# Patient Record
Sex: MALE | Race: BLACK OR AFRICAN AMERICAN | NOT HISPANIC OR LATINO | Employment: UNEMPLOYED | URBAN - METROPOLITAN AREA
[De-identification: names, ages, dates, MRNs, and addresses within clinical notes are randomized per-mention and may not be internally consistent; named-entity substitution may affect disease eponyms.]

---

## 2023-03-28 ENCOUNTER — HOSPITAL ENCOUNTER (EMERGENCY)
Facility: CLINIC | Age: 23
Discharge: HOME | End: 2023-03-28
Attending: EMERGENCY MEDICINE | Admitting: EMERGENCY MEDICINE
Payer: COMMERCIAL

## 2023-03-28 ENCOUNTER — APPOINTMENT (OUTPATIENT)
Dept: GENERAL RADIOLOGY | Facility: CLINIC | Age: 23
End: 2023-03-28
Attending: EMERGENCY MEDICINE
Payer: COMMERCIAL

## 2023-03-28 VITALS
SYSTOLIC BLOOD PRESSURE: 110 MMHG | HEART RATE: 64 BPM | OXYGEN SATURATION: 99 % | RESPIRATION RATE: 16 BRPM | WEIGHT: 165.9 LBS | TEMPERATURE: 97.7 F | DIASTOLIC BLOOD PRESSURE: 59 MMHG

## 2023-03-28 DIAGNOSIS — R07.89 ATYPICAL CHEST PAIN: ICD-10-CM

## 2023-03-28 LAB
ANION GAP SERPL CALCULATED.3IONS-SCNC: 13 MMOL/L (ref 7–15)
BASOPHILS # BLD AUTO: 0 10E3/UL (ref 0–0.2)
BASOPHILS NFR BLD AUTO: 0 %
BUN SERPL-MCNC: 18.5 MG/DL (ref 6–20)
CALCIUM SERPL-MCNC: 9.6 MG/DL (ref 8.6–10)
CHLORIDE SERPL-SCNC: 98 MMOL/L (ref 98–107)
CREAT SERPL-MCNC: 0.94 MG/DL (ref 0.67–1.17)
DEPRECATED HCO3 PLAS-SCNC: 25 MMOL/L (ref 22–29)
EOSINOPHIL # BLD AUTO: 0.5 10E3/UL (ref 0–0.7)
EOSINOPHIL NFR BLD AUTO: 9 %
ERYTHROCYTE [DISTWIDTH] IN BLOOD BY AUTOMATED COUNT: 11.1 % (ref 10–15)
GFR SERPL CREATININE-BSD FRML MDRD: >90 ML/MIN/1.73M2
GLUCOSE SERPL-MCNC: 78 MG/DL (ref 70–99)
HCT VFR BLD AUTO: 43 % (ref 40–53)
HGB BLD-MCNC: 14.9 G/DL (ref 13.3–17.7)
IMM GRANULOCYTES # BLD: 0 10E3/UL
IMM GRANULOCYTES NFR BLD: 0 %
LYMPHOCYTES # BLD AUTO: 1.4 10E3/UL (ref 0.8–5.3)
LYMPHOCYTES NFR BLD AUTO: 27 %
MCH RBC QN AUTO: 31.3 PG (ref 26.5–33)
MCHC RBC AUTO-ENTMCNC: 34.7 G/DL (ref 31.5–36.5)
MCV RBC AUTO: 90 FL (ref 78–100)
MONOCYTES # BLD AUTO: 0.6 10E3/UL (ref 0–1.3)
MONOCYTES NFR BLD AUTO: 11 %
NEUTROPHILS # BLD AUTO: 2.8 10E3/UL (ref 1.6–8.3)
NEUTROPHILS NFR BLD AUTO: 53 %
NRBC # BLD AUTO: 0 10E3/UL
NRBC BLD AUTO-RTO: 0 /100
PLATELET # BLD AUTO: 220 10E3/UL (ref 150–450)
POTASSIUM SERPL-SCNC: 3.5 MMOL/L (ref 3.4–5.3)
RBC # BLD AUTO: 4.76 10E6/UL (ref 4.4–5.9)
SODIUM SERPL-SCNC: 136 MMOL/L (ref 136–145)
TROPONIN T SERPL HS-MCNC: 7 NG/L
TSH SERPL DL<=0.005 MIU/L-ACNC: 0.91 UIU/ML (ref 0.3–4.2)
WBC # BLD AUTO: 5.3 10E3/UL (ref 4–11)

## 2023-03-28 PROCEDURE — 84484 ASSAY OF TROPONIN QUANT: CPT | Performed by: EMERGENCY MEDICINE

## 2023-03-28 PROCEDURE — 93005 ELECTROCARDIOGRAM TRACING: CPT | Performed by: EMERGENCY MEDICINE

## 2023-03-28 PROCEDURE — 85025 COMPLETE CBC W/AUTO DIFF WBC: CPT | Performed by: EMERGENCY MEDICINE

## 2023-03-28 PROCEDURE — 93010 ELECTROCARDIOGRAM REPORT: CPT | Performed by: EMERGENCY MEDICINE

## 2023-03-28 PROCEDURE — 84443 ASSAY THYROID STIM HORMONE: CPT | Performed by: EMERGENCY MEDICINE

## 2023-03-28 PROCEDURE — 80048 BASIC METABOLIC PNL TOTAL CA: CPT | Performed by: EMERGENCY MEDICINE

## 2023-03-28 PROCEDURE — 99284 EMERGENCY DEPT VISIT MOD MDM: CPT | Mod: 25 | Performed by: EMERGENCY MEDICINE

## 2023-03-28 PROCEDURE — 99285 EMERGENCY DEPT VISIT HI MDM: CPT | Mod: 25 | Performed by: EMERGENCY MEDICINE

## 2023-03-28 PROCEDURE — 36415 COLL VENOUS BLD VENIPUNCTURE: CPT | Performed by: EMERGENCY MEDICINE

## 2023-03-28 PROCEDURE — 71046 X-RAY EXAM CHEST 2 VIEWS: CPT

## 2023-03-28 RX ORDER — ACETAMINOPHEN 500 MG
500-1000 TABLET ORAL EVERY 6 HOURS PRN
COMMUNITY

## 2023-03-28 ASSESSMENT — ACTIVITIES OF DAILY LIVING (ADL): ADLS_ACUITY_SCORE: 35

## 2023-03-28 NOTE — ED TRIAGE NOTES
Triage Assessment     Row Name 03/28/23 1520       Triage Assessment (Adult)    Airway WDL WDL       Respiratory WDL    Respiratory WDL WDL       Skin Circulation/Temperature WDL    Skin Circulation/Temperature WDL WDL       Cardiac WDL    Cardiac WDL WDL       Peripheral/Neurovascular WDL    Peripheral Neurovascular WDL WDL       Cognitive/Neuro/Behavioral WDL    Cognitive/Neuro/Behavioral WDL WDL

## 2023-03-28 NOTE — ED PROVIDER NOTES
ED Provider Note  [facility]      History     Chief Complaint   Patient presents with     Chest Pain     Past couple of months comes and goes.  Feels like a pinch then goes away quickly.     HPI  Raphael Castro is a 22 year old male who presents emergency department for evaluation of a left parasternal chest pain that has been occurring for the past several weeks.  Patient states he feels an instantaneous and short-lived pain in the left parasternal region that is a pinching sensation.  He states this occurs approximately 3 times per week.  He denies any other associated symptoms at the time but does report that he has felt more fatigued as of late as well.  He denies any leg pain or swelling.  No cough or hemoptysis.  No dyspnea.  No diaphoresis.  No nausea or vomiting.  He denies any leg swelling.  No recent travel or prolonged immobilization    Past Medical History  No past medical history on file.  No past surgical history on file.  acetaminophen (TYLENOL) 500 MG tablet      No Known Allergies  Family History  No family history on file.  Social History          A medically appropriate review of systems was performed with pertinent positives and negatives noted in the HPI, and all other systems negative.    Physical Exam   BP: 110/59  Pulse: 60  Temp: 97.7  F (36.5  C)  Resp: 16  Weight: 75.3 kg (165 lb 14.4 oz)  SpO2: 97 %  Physical Exam  Vitals and nursing note reviewed.   Constitutional:       General: He is not in acute distress.     Appearance: He is well-developed. He is not diaphoretic.   HENT:      Head: Normocephalic and atraumatic.   Eyes:      General: No scleral icterus.     Pupils: Pupils are equal, round, and reactive to light.   Cardiovascular:      Rate and Rhythm: Normal rate and regular rhythm.      Heart sounds: Normal heart sounds.   Pulmonary:      Effort: No respiratory distress.      Breath sounds: Normal breath sounds.   Abdominal:      General: Bowel sounds are  normal.      Palpations: Abdomen is soft.      Tenderness: There is no abdominal tenderness.   Musculoskeletal:         General: No tenderness.   Skin:     General: Skin is warm.      Findings: No rash.   Neurological:      General: No focal deficit present.      Mental Status: He is alert.           ED Course, Procedures, & Data      Procedures       ED Course Selections:        EKG Interpretation:      Interpreted by MAYELIN HARGROVE MD, MD  Time reviewed: 1633  Symptoms at time of EKG: none   Rhythm: sinus bradycardia  Rate: 55  Axis: normal  Ectopy: none  Conduction: normal  ST Segments/ T Waves: No ST-T wave changes  Q Waves: none  Comparison to prior: No old EKG available    Clinical Impression: Sinus bradycardia       Results for orders placed or performed during the hospital encounter of 03/28/23   XR Chest 2 Views     Status: None    Narrative    EXAM: XR CHEST 2 VIEWS  LOCATION: [facility]  DATE/TIME: 3/28/2023 5:53 PM    INDICATION: chest pain  COMPARISON: None.      Impression    IMPRESSION: Negative chest.   Basic metabolic panel     Status: Normal   Result Value Ref Range    Sodium 136 136 - 145 mmol/L    Potassium 3.5 3.4 - 5.3 mmol/L    Chloride 98 98 - 107 mmol/L    Carbon Dioxide (CO2) 25 22 - 29 mmol/L    Anion Gap 13 7 - 15 mmol/L    Urea Nitrogen 18.5 6.0 - 20.0 mg/dL    Creatinine 0.94 0.67 - 1.17 mg/dL    Calcium 9.6 8.6 - 10.0 mg/dL    Glucose 78 70 - 99 mg/dL    GFR Estimate >90 >60 mL/min/1.73m2   TSH with free T4 reflex     Status: Normal   Result Value Ref Range    TSH 0.91 0.30 - 4.20 uIU/mL   Troponin T, High Sensitivity     Status: Normal   Result Value Ref Range    Troponin T, High Sensitivity 7 <=22 ng/L   CBC with platelets and differential     Status: None   Result Value Ref Range    WBC Count 5.3 4.0 - 11.0 10e3/uL    RBC Count 4.76 4.40 - 5.90 10e6/uL    Hemoglobin 14.9 13.3 - 17.7 g/dL    Hematocrit 43.0 40.0 - 53.0 %    MCV 90 78 - 100  fL    MCH 31.3 26.5 - 33.0 pg    MCHC 34.7 31.5 - 36.5 g/dL    RDW 11.1 10.0 - 15.0 %    Platelet Count 220 150 - 450 10e3/uL    % Neutrophils 53 %    % Lymphocytes 27 %    % Monocytes 11 %    % Eosinophils 9 %    % Basophils 0 %    % Immature Granulocytes 0 %    NRBCs per 100 WBC 0 <1 /100    Absolute Neutrophils 2.8 1.6 - 8.3 10e3/uL    Absolute Lymphocytes 1.4 0.8 - 5.3 10e3/uL    Absolute Monocytes 0.6 0.0 - 1.3 10e3/uL    Absolute Eosinophils 0.5 0.0 - 0.7 10e3/uL    Absolute Basophils 0.0 0.0 - 0.2 10e3/uL    Absolute Immature Granulocytes 0.0 <=0.4 10e3/uL    Absolute NRBCs 0.0 10e3/uL   CBC with platelets differential     Status: None    Narrative    The following orders were created for panel order CBC with platelets differential.  Procedure                               Abnormality         Status                     ---------                               -----------         ------                     CBC with platelets and d...[[id number]]                      Final result                 Please view results for these tests on the individual orders.     Medications - No data to display  Labs Ordered and Resulted from Time of ED Arrival to Time of ED Departure   BASIC METABOLIC PANEL - Normal       Result Value    Sodium 136      Potassium 3.5      Chloride 98      Carbon Dioxide (CO2) 25      Anion Gap 13      Urea Nitrogen 18.5      Creatinine 0.94      Calcium 9.6      Glucose 78      GFR Estimate >90     TSH WITH FREE T4 REFLEX - Normal    TSH 0.91     TROPONIN T, HIGH SENSITIVITY - Normal    Troponin T, High Sensitivity 7     CBC WITH PLATELETS AND DIFFERENTIAL    WBC Count 5.3      RBC Count 4.76      Hemoglobin 14.9      Hematocrit 43.0      MCV 90      MCH 31.3      MCHC 34.7      RDW 11.1      Platelet Count 220      % Neutrophils 53      % Lymphocytes 27      % Monocytes 11      % Eosinophils 9      % Basophils 0      % Immature Granulocytes 0      NRBCs per 100 WBC 0      Absolute Neutrophils 2.8       Absolute Lymphocytes 1.4      Absolute Monocytes 0.6      Absolute Eosinophils 0.5      Absolute Basophils 0.0      Absolute Immature Granulocytes 0.0      Absolute NRBCs 0.0       XR Chest 2 Views   Final Result   IMPRESSION: Negative chest.             Critical care was not performed.     Medical Decision Making  The patient's presentation was of moderate complexity (an undiagnosed new problem with uncertain diagnosis).    The patient's evaluation involved:  ordering and/or review of 3+ test(s) in this encounter (see separate area of note for details)    The patient's management necessitated only low risk treatment.      Assessment & Plan    22 year old female to the emergency room for evaluation of occasional left parasternal chest pain without associated symptoms.  He has a normal physical examination.  Frontal diagnosis includes premature beats, arrhythmia, electrolyte disturbance, thyroid disturbance.  Less likely ACS, pneumonia, pneumothorax.  EKG is sinus bradycardia without acute ischemic change and troponin is normal so not suspect ACS.  Chest radiograph does not reveal any acute abnormality so do not suspect pneumonia or pneumothorax.  No electrolyte disturbance on laboratory testing.  Thyroid studies also normal.  Suspect symptoms most likely related to premature beats.  No clinical signs or symptoms of more serious pathology.  Will discharge to home with plan for primary care follow-up as needed.  Return precautions provided.    I have reviewed the nursing notes. I have reviewed the findings, diagnosis, plan and need for follow up with the patient.    New Prescriptions    No medications on file       Final diagnoses:   Atypical chest pain     Chart documentation was completed with Dragon voice-recognition software. Even though reviewed, this chart may still contain some grammatical, spelling, and word errors.     Tito Dominguez Md  [facility] EMERGENCY DEPARTMENT  3/28/2023     Alberto  MD Tito  03/28/23 1822

## 2023-03-28 NOTE — DISCHARGE INSTRUCTIONS
Activity as tolerated.  Maintain a normal sleep-wake cycle.  Eat a well-balanced diet.    Follow-up with your primary care clinic in 1 to 2 weeks if ongoing symptoms.    Return to the emergency department if persistent chest pain, trouble breathing, fever, sensation that your heart is beating fast for a long period of time, or further concerns.

## 2023-03-29 LAB
ATRIAL RATE - MUSE: 55 BPM
DIASTOLIC BLOOD PRESSURE - MUSE: NORMAL MMHG
INTERPRETATION ECG - MUSE: NORMAL
P AXIS - MUSE: 46 DEGREES
PR INTERVAL - MUSE: 114 MS
QRS DURATION - MUSE: 86 MS
QT - MUSE: 418 MS
QTC - MUSE: 399 MS
R AXIS - MUSE: 71 DEGREES
SYSTOLIC BLOOD PRESSURE - MUSE: NORMAL MMHG
T AXIS - MUSE: 58 DEGREES
VENTRICULAR RATE- MUSE: 55 BPM

## 2023-06-11 ENCOUNTER — HOSPITAL ENCOUNTER (EMERGENCY)
Facility: CLINIC | Age: 23
Discharge: HOME | End: 2023-06-11
Attending: EMERGENCY MEDICINE | Admitting: EMERGENCY MEDICINE
Payer: COMMERCIAL

## 2023-06-11 VITALS
TEMPERATURE: 98.2 F | HEART RATE: 50 BPM | RESPIRATION RATE: 16 BRPM | DIASTOLIC BLOOD PRESSURE: 67 MMHG | OXYGEN SATURATION: 98 % | SYSTOLIC BLOOD PRESSURE: 103 MMHG | WEIGHT: 165 LBS

## 2023-06-11 DIAGNOSIS — F48.9 MENTAL HEALTH PROBLEM: ICD-10-CM

## 2023-06-11 PROCEDURE — 99283 EMERGENCY DEPT VISIT LOW MDM: CPT | Performed by: EMERGENCY MEDICINE

## 2023-06-11 PROCEDURE — 99282 EMERGENCY DEPT VISIT SF MDM: CPT | Performed by: EMERGENCY MEDICINE

## 2023-06-11 NOTE — DISCHARGE INSTRUCTIONS
Call numbers on form to schedule neuropsychological testing to determine if you would benefit from medications for concentration.

## 2023-06-11 NOTE — ED TRIAGE NOTES
"Pt seeking rx for adderal, not dx with ADHD or ADD or \"any medication that will help me focus\". Pt has not been seen for this before, denies having a PCP. Pt denies SI, HI or hallucinations.      Triage Assessment     Row Name 06/11/23 4815       Triage Assessment (Adult)    Airway WDL WDL       Respiratory WDL    Respiratory WDL WDL       Skin Circulation/Temperature WDL    Skin Circulation/Temperature WDL WDL       Cardiac WDL    Cardiac WDL WDL       Peripheral/Neurovascular WDL    Peripheral Neurovascular WDL WDL       Cognitive/Neuro/Behavioral WDL    Cognitive/Neuro/Behavioral WDL WDL              "

## 2023-06-11 NOTE — ED PROVIDER NOTES
[facility] [place] [facility] ([facility])    6/11/23      ED PROVIDER NOTE   [address]   History     Chief Complaint   Patient presents with     seeking adderal medication     Pt seeking rx for adderal, not dx with ADHD or ADD. Pt has not been seen for this before     HPI  Raphael Castro is an otherwise healthy 22 year old male who presents seeking prescription for ADHD/ADD. He states he has had had difficulty sitting and concentrating for years, constant day dreaming.  He notes that this has been interfering with his work.  He is hoping for this medication as he wants something to help him with these symptoms.  He does not have a primary care doctor and has never seen a therapist or psychiatrist previously.  He denies any concerns earns for anxiety, depression, suicidal or homicidal ideation.  Denies any acute medical concerns.    I have reviewed the Medications, Allergies, Past Medical and Surgical History, and Social History in the [facility] system.  History reviewed. No pertinent past medical history.    History reviewed. No pertinent surgical history.    History reviewed. No pertinent family history.    Social History     Tobacco Use     Smoking status: Never     Passive exposure: Never     Smokeless tobacco: Never   Vaping Use     Vaping status: Not on file   Substance Use Topics     Alcohol use: Not Currently     No current facility-administered medications for this encounter.     Current Outpatient Medications   Medication     acetaminophen (TYLENOL) 500 MG tablet      No Known Allergies       Physical Exam   BP: 103/67  Pulse: 50  Temp: 98.2  F (36.8  C)  Resp: 16  Weight: 74.8 kg (165 lb)  SpO2: 98 %      Physical Exam  General: patient is alert and oriented and in no acute distress   Head: atraumatic and normocephalic   EENT: moist mucus membranes   Neck: supple   Pulmonary: no respiratory distress  Musculoskeletal: normal range of motion   Neurological: alert and oriented, moving all extremities  symmetrically, gait normal   Skin: warm, dry   Psych: Speech is normal in rate and tone, linear thought process, somewhat limited insight, does not appear to be responding to internal stimuli, denies SI    ED Course        Procedures         Labs Ordered and Resulted from Time of ED Arrival to Time of ED Departure - No data to display         Critical care was not performed.     Medical Decision Making  The patient's presentation was of low complexity (a stable chronic illness).    The patient's evaluation involved:  history and exam without other MDM data elements    The patient's management necessitated only low risk treatment.       Assessments & Plan (with Medical Decision Making)   Mr. Castro is an otherwise healthy 22 year old male who presents to the emergency department seeking medications for possible attention disorder.  He is hemodynamically stable, afebrile and in no respiratory distress.  He denies any concerns for depression, anxiety, SI, does not appear to be responding to internal stimuli or other emergent mental health concerns.  Denies any acute medical concerns.  He was instructed on the process for an ADD/ADHD diagnosis and the need to undergo outpatient testing to determine appropriate medication management.  He was given a list of contacts for locations to do neuropsych testing.  Will plan to follow-up in the outpatient setting for further management.    I have reviewed the nursing notes.    I have reviewed the findings, diagnosis, plan and need for follow up with the patient.    New Prescriptions    No medications on file       Final diagnoses:   Mental health problem       6/11/2023   [facility] EMERGENCY DEPARTMENT      Shellie Braswell MD  06/11/23 4357